# Patient Record
Sex: FEMALE | Race: WHITE | URBAN - METROPOLITAN AREA
[De-identification: names, ages, dates, MRNs, and addresses within clinical notes are randomized per-mention and may not be internally consistent; named-entity substitution may affect disease eponyms.]

---

## 2017-11-30 ENCOUNTER — IMPORTED ENCOUNTER (OUTPATIENT)
Dept: URBAN - METROPOLITAN AREA CLINIC 50 | Facility: CLINIC | Age: 82
End: 2017-11-30

## 2018-11-13 ENCOUNTER — IMPORTED ENCOUNTER (OUTPATIENT)
Dept: URBAN - METROPOLITAN AREA CLINIC 50 | Facility: CLINIC | Age: 83
End: 2018-11-13

## 2019-01-10 ENCOUNTER — IMPORTED ENCOUNTER (OUTPATIENT)
Dept: URBAN - METROPOLITAN AREA CLINIC 50 | Facility: CLINIC | Age: 84
End: 2019-01-10

## 2019-01-11 ENCOUNTER — IMPORTED ENCOUNTER (OUTPATIENT)
Dept: URBAN - METROPOLITAN AREA CLINIC 50 | Facility: CLINIC | Age: 84
End: 2019-01-11

## 2019-01-16 ENCOUNTER — IMPORTED ENCOUNTER (OUTPATIENT)
Dept: URBAN - METROPOLITAN AREA CLINIC 50 | Facility: CLINIC | Age: 84
End: 2019-01-16

## 2019-01-16 NOTE — PATIENT DISCUSSION
"""S/P IOL OS: Tecnis ZCB00 22.5 (ORA) +ORA/Femto/Arcs +Omidria. Continue post operative instructions and drops per schedule.  """

## 2019-01-25 ENCOUNTER — IMPORTED ENCOUNTER (OUTPATIENT)
Dept: URBAN - METROPOLITAN AREA CLINIC 50 | Facility: CLINIC | Age: 84
End: 2019-01-25

## 2019-01-30 ENCOUNTER — IMPORTED ENCOUNTER (OUTPATIENT)
Dept: URBAN - METROPOLITAN AREA CLINIC 50 | Facility: CLINIC | Age: 84
End: 2019-01-30

## 2019-01-30 NOTE — PATIENT DISCUSSION
"""S/P IOL OD: Tecnis ZCB00 23 +Femto +Omidria. Continue post operative instructions and drops per schedule.  """

## 2019-02-08 ENCOUNTER — IMPORTED ENCOUNTER (OUTPATIENT)
Dept: URBAN - METROPOLITAN AREA CLINIC 50 | Facility: CLINIC | Age: 84
End: 2019-02-08

## 2019-03-01 ENCOUNTER — IMPORTED ENCOUNTER (OUTPATIENT)
Dept: URBAN - METROPOLITAN AREA CLINIC 50 | Facility: CLINIC | Age: 84
End: 2019-03-01

## 2019-03-01 NOTE — PATIENT DISCUSSION
"""S/P IOL OU: OD: Tecnis ZCB00 [address] [facility]. OS: Tecnis ZCB00 22.5 (ORA) +ORA/Arcs.  ""

## 2019-06-27 ENCOUNTER — IMPORTED ENCOUNTER (OUTPATIENT)
Dept: URBAN - METROPOLITAN AREA CLINIC 50 | Facility: CLINIC | Age: 84
End: 2019-06-27

## 2019-12-19 ENCOUNTER — IMPORTED ENCOUNTER (OUTPATIENT)
Dept: URBAN - METROPOLITAN AREA CLINIC 50 | Facility: CLINIC | Age: 84
End: 2019-12-19

## 2021-01-07 ENCOUNTER — IMPORTED ENCOUNTER (OUTPATIENT)
Dept: URBAN - METROPOLITAN AREA CLINIC 50 | Facility: CLINIC | Age: 86
End: 2021-01-07

## 2021-04-18 ASSESSMENT — VISUAL ACUITY
OS_SC: 20/30
OS_OTHER: <400.
OD_BAT: 20/20
OS_SC: 20/40-1
OS_CC: 20/40
OS_CC: 20/25
OD_SC: 20/70
OS_BAT: <20/400
OD_BAT: 20/200
OD_CC: J1@ 16 IN
OS_BAT: <20/400
OD_BAT: 20/200
OS_CC: J1@ 16 IN
OS_CC: 20/40-
OD_OTHER: 20/20. 20/25.
OS_CC: J1+@ 16 IN
OS_OTHER: 20/20. 20/25.
OD_BAT: 20/200
OS_OTHER: <20/400. <20/400.
OS_SC: 20/30
OD_SC: 20/30
OD_OTHER: 20/200.
OS_OTHER: 20/25. 20/30.
OD_CC: 20/20
OD_CC: 20/30-
OS_CC: J1+
OS_BAT: 20/20
OS_CC: 20/50
OD_SC: 20/30
OD_CC: 20/40
OS_BAT: <400
OD_CC: 20/40+2
OS_SC: 20/20-1
OD_BAT: 20/25
OD_SC: 20/40
OD_CC: J1+
OS_CC: J3
OD_OTHER: 20/25-1. 20/40.
OS_BAT: 20/25
OD_OTHER: 20/60. 20/80.
OS_PH: 20/25
OD_CC: J3
OD_CC: 20/25
OS_BAT: 20/25-1
OD_CC: J1+
OS_OTHER: <20/400.
OS_CC: J1
OD_BAT: 20/60
OD_SC: 20/25-3
OD_BAT: 20/25-1
OD_OTHER: 20/25. 20/30.
OD_CC: J1
OS_CC: 20/40+3
OS_CC: J1+
OS_CC: 20/30
OD_CC: J1+@ 16 IN

## 2021-04-18 ASSESSMENT — TONOMETRY
OD_IOP_MMHG: 14
OS_IOP_MMHG: 20
OS_IOP_MMHG: 14
OD_IOP_MMHG: 14
OD_IOP_MMHG: 11
OS_IOP_MMHG: 14
OD_IOP_MMHG: 11
OS_IOP_MMHG: 12
OS_IOP_MMHG: 11
OD_IOP_MMHG: 12
OS_IOP_MMHG: 13
OD_IOP_MMHG: 12
OD_IOP_MMHG: 19
OD_IOP_MMHG: 14
OD_IOP_MMHG: 12
OS_IOP_MMHG: 14
OS_IOP_MMHG: 12
OS_IOP_MMHG: 12
OD_IOP_MMHG: 12
OS_IOP_MMHG: 11

## 2022-01-11 ENCOUNTER — PREPPED CHART (OUTPATIENT)
Dept: URBAN - METROPOLITAN AREA CLINIC 49 | Facility: CLINIC | Age: 87
End: 2022-01-11

## 2022-02-03 ENCOUNTER — COMPREHENSIVE EXAM (OUTPATIENT)
Dept: URBAN - METROPOLITAN AREA CLINIC 49 | Facility: CLINIC | Age: 87
End: 2022-02-03

## 2022-02-03 DIAGNOSIS — H43.813: ICD-10-CM

## 2022-02-03 DIAGNOSIS — H26.492: ICD-10-CM

## 2022-02-03 DIAGNOSIS — H04.123: ICD-10-CM

## 2022-02-03 DIAGNOSIS — H26.491: ICD-10-CM

## 2022-02-03 PROCEDURE — 66821 AFTER CATARACT LASER SURGERY: CPT

## 2022-02-03 PROCEDURE — 92014 COMPRE OPH EXAM EST PT 1/>: CPT

## 2022-02-03 PROCEDURE — 92134 CPTRZ OPH DX IMG PST SGM RTA: CPT

## 2022-02-03 ASSESSMENT — VISUAL ACUITY
OD_SC: 20/25-
OU_CC: J1+
OD_CC: 20/25
OD_GLARE: 20/25
OS_PH: 20/40-
OS_SC: 20/70
OS_CC: 20/70
OD_GLARE: 20/100

## 2022-02-03 ASSESSMENT — TONOMETRY
OD_IOP_MMHG: 13
OS_IOP_MMHG: 13
OS_IOP_MMHG: 14

## 2022-02-03 NOTE — PATIENT DISCUSSION
PCO ([id number]): Visually significant PCO present on exam today. Recommend YAG laser capsulotomy in 1-4 weeks to improve vision and decrease glare symptoms. RBAs of procedure discussed. Patient agrees and wishes to proceed.

## 2022-02-03 NOTE — PROCEDURE NOTE: CLINICAL
PROCEDURE NOTE: YAG Capsulotomy OS. Diagnosis: Posterior Capsular Opacification (PCO). Prep: Mydriacil 1% and Phenylephrine 2.5%. Prior to treatment, the risks/benefits/alternatives were discussed. The patient wished to proceed with procedure. Consent was signed. Proparacaine and brominidine were placed into the operative eye after the eye was dilated. Power = 1.1mJ. Number of pulses = 42. Patient tolerated procedure well and there were no complications. Post Laser instructions given. Meche Canter

## 2022-02-03 NOTE — PATIENT DISCUSSION
PCO ([id number] [place] [id number]): Visually significant PCO present on exam today. Recommend YAG laser capsulotomy today  to improve vision and decrease glare symptoms. RBAs of procedure discussed. Patient agrees and wishes to proceed.

## 2022-02-17 ENCOUNTER — POST-OP (OUTPATIENT)
Dept: URBAN - METROPOLITAN AREA CLINIC 49 | Facility: CLINIC | Age: 87
End: 2022-02-17

## 2022-02-17 DIAGNOSIS — H26.491: ICD-10-CM

## 2022-02-17 DIAGNOSIS — Z98.890: ICD-10-CM

## 2022-02-17 PROCEDURE — 99024 POSTOP FOLLOW-UP VISIT: CPT

## 2022-02-17 PROCEDURE — 92015 DETERMINE REFRACTIVE STATE: CPT

## 2022-02-17 ASSESSMENT — VISUAL ACUITY
OS_PH: 20/40-2
OU_CC: J1+
OD_CC: 20/25
OS_CC: 20/70

## 2022-02-17 ASSESSMENT — TONOMETRY
OS_IOP_MMHG: 13
OD_IOP_MMHG: 13

## 2024-02-28 ENCOUNTER — COMPREHENSIVE EXAM (OUTPATIENT)
Dept: URBAN - METROPOLITAN AREA CLINIC 49 | Facility: LOCATION | Age: 89
End: 2024-02-28

## 2024-02-28 DIAGNOSIS — H52.4: ICD-10-CM

## 2024-02-28 DIAGNOSIS — H26.491: ICD-10-CM

## 2024-02-28 DIAGNOSIS — H43.813: ICD-10-CM

## 2024-02-28 DIAGNOSIS — H40.051: ICD-10-CM

## 2024-02-28 DIAGNOSIS — H04.123: ICD-10-CM

## 2024-02-28 PROCEDURE — 99214 OFFICE O/P EST MOD 30 MIN: CPT

## 2024-02-28 ASSESSMENT — VISUAL ACUITY
OU_SC: 20/20
OS_PH: 20/25
OS_SC: 20/30-2
OD_GLARE: 20/25
OU_CC: J1+ @ 16IN
OD_SC: 20/20
OD_GLARE: 20/20

## 2024-02-28 ASSESSMENT — TONOMETRY
OD_IOP_MMHG: 10
OS_IOP_MMHG: 10